# Patient Record
Sex: FEMALE | Race: WHITE | NOT HISPANIC OR LATINO | Employment: FULL TIME | URBAN - METROPOLITAN AREA
[De-identification: names, ages, dates, MRNs, and addresses within clinical notes are randomized per-mention and may not be internally consistent; named-entity substitution may affect disease eponyms.]

---

## 2023-11-08 NOTE — PROGRESS NOTES
Assessment/Plan:    Hot flashes  36 yo , last seen 2021 who reports menopause symptoms and irregular menses. LMP 2023;  2023; cycles up to 60 days prior to that. C/o night sweats, dry skin, brain fog, dyspareunia, and hot flashes during the day exacerbated merline by alcohol. Declines exam today. Last pap , due  Last mammo , orders given  No colon cancer screening to date. Discussed with patient hot flashes and menopausal symptoms of sleep deprivation and vaginal dryness. Behavioral modification, avoidance of exacerbating agents (alcohol, red wine, hot liquids), natural herbs (black cohash) and hormone replacement therapy reviewed. Aware of risks of estrogen including increased risk of clots in legs and lungs and cardiovascular events if started late in menopause. Questions answered, desires HRT. Rx sent for vivelle dot and progesterone 200 mg day 1-12 of each month. Estradiol cream rx printed. RTO 3 months for well check, pap at that time. Aware and agrees to mammogram prior to initiation of HRT with slight increased risk of breast cancer with therapy. Diagnoses and all orders for this visit:    Hot flashes  -     estradiol (Vivelle-Dot) 0.1 MG/24HR; Place 1 patch on the skin 2 (two) times a week  -     Progesterone (Prometrium) 200 MG CAPS; One tablet daily on the 1st through the 12th of each month. Encounter for screening mammogram for malignant neoplasm of breast  -     Mammo screening bilateral w 3d & cad; Future    Vaginal atrophy  -     estradiol (ESTRACE VAGINAL) 0.1 mg/g vaginal cream; Insert 1 g into the vagina 2 (two) times a week    Other orders  -     Liquid-based pap, screening  -     amLODIPine (NORVASC) 5 mg tablet; Take 5 mg by mouth daily          Subjective:      Patient ID: Kam Simons is a 37 y.o. female.     HPI here to discuss HRT    The following portions of the patient's history were reviewed and updated as appropriate: She  has a past medical history of Hypertension. She   Patient Active Problem List    Diagnosis Date Noted    Hot flashes 11/10/2023     She  has a past surgical history that includes Breast biopsy (Bilateral, );  section; Dilation and curettage of uterus (); and [place] tooth extraction. Her family history includes Atopy in her father; Celiac disease in her brother; Hypertension in her maternal grandmother and mother; No Known Problems in her brother, brother, daughter, son, and son; Parkinsonism in her maternal grandfather; Thyroid disease in her maternal grandmother. She  reports that she has never smoked. She has never used smokeless tobacco. She reports current alcohol use. She reports that she does not use drugs. Current Outpatient Medications   Medication Sig Dispense Refill    amLODIPine (NORVASC) 5 mg tablet Take 5 mg by mouth daily      [START ON 2023] estradiol (ESTRACE VAGINAL) 0.1 mg/g vaginal cream Insert 1 g into the vagina 2 (two) times a week 42.5 g 2    [START ON 2023] estradiol (Vivelle-Dot) 0.1 MG/24HR Place 1 patch on the skin 2 (two) times a week 12 patch 0    Progesterone (Prometrium) 200 MG CAPS One tablet daily on the 1st through the 12th of each month. 36 capsule 0     No current facility-administered medications for this visit. She is allergic to azithromycin. .    Review of Systems  see above    Objective:      /90 (BP Location: Left arm, Patient Position: Sitting, Cuff Size: Large)   LMP 2023 (Exact Date)          Physical Exam    Appears well, no apparent distress. Does not appear anxious or depressed.     I have spent a total time of 35 minutes on 11/10/23 in caring for this patient including Risks and benefits of tx options, Instructions for management, Patient and family education, Importance of tx compliance, Risk factor reductions, Impressions, Counseling / Coordination of care, Documenting in the medical record, Reviewing / ordering tests, medicine, procedures  , Obtaining or reviewing history  , and Communicating with other healthcare professionals .

## 2023-11-10 ENCOUNTER — OFFICE VISIT (OUTPATIENT)
Dept: OBGYN CLINIC | Facility: CLINIC | Age: 43
End: 2023-11-10

## 2023-11-10 VITALS — SYSTOLIC BLOOD PRESSURE: 152 MMHG | DIASTOLIC BLOOD PRESSURE: 90 MMHG

## 2023-11-10 DIAGNOSIS — R23.2 HOT FLASHES: Primary | ICD-10-CM

## 2023-11-10 DIAGNOSIS — Z12.31 ENCOUNTER FOR SCREENING MAMMOGRAM FOR MALIGNANT NEOPLASM OF BREAST: ICD-10-CM

## 2023-11-10 DIAGNOSIS — N95.2 VAGINAL ATROPHY: ICD-10-CM

## 2023-11-10 RX ORDER — AMLODIPINE BESYLATE 5 MG/1
5 TABLET ORAL DAILY
COMMUNITY
Start: 2023-10-17

## 2023-11-10 RX ORDER — ESTRADIOL 0.1 MG/D
1 FILM, EXTENDED RELEASE TRANSDERMAL 2 TIMES WEEKLY
Qty: 12 PATCH | Refills: 0 | Status: SHIPPED | OUTPATIENT
Start: 2023-11-13

## 2023-11-10 RX ORDER — ESTRADIOL 0.1 MG/G
1 CREAM VAGINAL 2 TIMES WEEKLY
Qty: 42.5 G | Refills: 2 | Status: SHIPPED | OUTPATIENT
Start: 2023-11-13

## 2023-11-10 RX ORDER — PROGESTERONE 200 MG/1
CAPSULE ORAL
Qty: 36 CAPSULE | Refills: 0 | Status: SHIPPED | OUTPATIENT
Start: 2023-11-10

## 2023-11-10 NOTE — ASSESSMENT & PLAN NOTE
38 yo , last seen 2021 who reports menopause symptoms and irregular menses. LMP 2023;  2023; cycles up to 60 days prior to that. C/o night sweats, dry skin, brain fog, dyspareunia, and hot flashes during the day exacerbated merline by alcohol. Declines exam today. Last pap , due  Last mammo , orders given  No colon cancer screening to date. Discussed with patient hot flashes and menopausal symptoms of sleep deprivation and vaginal dryness. Behavioral modification, avoidance of exacerbating agents (alcohol, red wine, hot liquids), natural herbs (black cohash) and hormone replacement therapy reviewed. Aware of risks of estrogen including increased risk of clots in legs and lungs and cardiovascular events if started late in menopause. Questions answered, desires HRT. Rx sent for vivelle dot and progesterone 200 mg day 1-12 of each month. Estradiol cream rx printed. RTO 3 months for well check, pap at that time. Aware and agrees to mammogram prior to initiation of HRT with slight increased risk of breast cancer with therapy.

## 2023-11-10 NOTE — LETTER
November 10, 2023     Kirsten Wilks, [address]  [address] [place], [place] [id number]    Patient: Lisha Mims   YOB: 1980   Date of Visit: 11/10/2023       Dear Dr. Beti Lee:    Lisha Mims was in to see me today for evaluation. Below are my notes for this consultation. If you have questions, please do not hesitate to call me. I look forward to following your patient along with you. Sincerely,        Andreina Meza MD        CC: No Recipients    Andreina Meza MD  11/10/2023  2:04 PM  Sign when Signing Visit  Assessment/Plan:    Hot flashes  38 yo , last seen 2021 who reports menopause symptoms and irregular menses. LMP 2023;  2023; cycles up to 60 days prior to that. C/o night sweats, dry skin, brain fog, dyspareunia, and hot flashes during the day exacerbated merline by alcohol. Declines exam today. Last pap , due  Last mammo , orders given  No colon cancer screening to date. Discussed with patient hot flashes and menopausal symptoms of sleep deprivation and vaginal dryness. Behavioral modification, avoidance of exacerbating agents (alcohol, red wine, hot liquids), natural herbs (black cohash) and hormone replacement therapy reviewed. Aware of risks of estrogen including increased risk of clots in legs and lungs and cardiovascular events if started late in menopause. Questions answered, desires HRT. Rx sent for vivelle dot and progesterone 200 mg day 1-12 of each month. Estradiol cream rx printed. RTO 3 months for well check, pap at that time. Aware and agrees to mammogram prior to initiation of HRT with slight increased risk of breast cancer with therapy. Diagnoses and all orders for this visit:    Hot flashes  -     estradiol (Vivelle-Dot) 0.1 MG/24HR; Place 1 patch on the skin 2 (two) times a week  -     Progesterone (Prometrium) 200 MG CAPS;  One tablet daily on the 1st through the 12th of each month.    Encounter for screening mammogram for malignant neoplasm of breast  -     Mammo screening bilateral w 3d & cad; Future    Vaginal atrophy  -     estradiol (ESTRACE VAGINAL) 0.1 mg/g vaginal cream; Insert 1 g into the vagina 2 (two) times a week    Other orders  -     Liquid-based pap, screening  -     amLODIPine (NORVASC) 5 mg tablet; Take 5 mg by mouth daily          Subjective:      Patient ID: Kam Simons is a 37 y.o. female. HPI here to discuss HRT    The following portions of the patient's history were reviewed and updated as appropriate: She  has a past medical history of Hypertension. She   Patient Active Problem List    Diagnosis Date Noted   • Hot flashes 11/10/2023     She  has a past surgical history that includes Breast biopsy (Bilateral, );  section; Dilation and curettage of uterus (); and [place] tooth extraction. Her family history includes Atopy in her father; Celiac disease in her brother; Hypertension in her maternal grandmother and mother; No Known Problems in her brother, brother, daughter, son, and son; Parkinsonism in her maternal grandfather; Thyroid disease in her maternal grandmother. She  reports that she has never smoked. She has never used smokeless tobacco. She reports current alcohol use. She reports that she does not use drugs. Current Outpatient Medications   Medication Sig Dispense Refill   • amLODIPine (NORVASC) 5 mg tablet Take 5 mg by mouth daily     • [START ON 2023] estradiol (ESTRACE VAGINAL) 0.1 mg/g vaginal cream Insert 1 g into the vagina 2 (two) times a week 42.5 g 2   • [START ON 2023] estradiol (Vivelle-Dot) 0.1 MG/24HR Place 1 patch on the skin 2 (two) times a week 12 patch 0   • Progesterone (Prometrium) 200 MG CAPS One tablet daily on the 1st through the 12th of each month. 36 capsule 0     No current facility-administered medications for this visit. She is allergic to azithromycin. .    Review of Systems  see above    Objective:      /90 (BP Location: Left arm, Patient Position: Sitting, Cuff Size: Large)   LMP 04/04/2023 (Exact Date)          Physical Exam    Appears well, no apparent distress. Does not appear anxious or depressed. I have spent a total time of 35 minutes on 11/10/23 in caring for this patient including Risks and benefits of tx options, Instructions for management, Patient and family education, Importance of tx compliance, Risk factor reductions, Impressions, Counseling / Coordination of care, Documenting in the medical record, Reviewing / ordering tests, medicine, procedures  , Obtaining or reviewing history  , and Communicating with other healthcare professionals .

## 2023-12-31 DIAGNOSIS — R23.2 HOT FLASHES: ICD-10-CM

## 2024-01-02 RX ORDER — ESTRADIOL 0.1 MG/D
FILM, EXTENDED RELEASE TRANSDERMAL
Qty: 8 PATCH | Refills: 1 | Status: SHIPPED | OUTPATIENT
Start: 2024-01-02

## 2024-01-05 DIAGNOSIS — Z12.31 ENCOUNTER FOR SCREENING MAMMOGRAM FOR MALIGNANT NEOPLASM OF BREAST: ICD-10-CM

## 2024-02-10 PROBLEM — Z01.419 ENCOUNTER FOR GYNECOLOGICAL EXAMINATION (GENERAL) (ROUTINE) WITHOUT ABNORMAL FINDINGS: Status: ACTIVE | Noted: 2024-02-10

## 2024-02-14 NOTE — PROGRESS NOTES
Assessment/Plan:    Encounter for gynecological examination (general) (routine) without abnormal findings  SO happy with HRT. Resolved hfs and sleeping through the night. Cycles adjusting. Refills sent  No breast or gyn complaints.   Normal breast and pelvic exams.  Last pap 10/6/2016 neg/HPV neg; repeated today  Mammo order given, last 23  Colonoscopy recs reviewed, interested in Cologuard. Order placed, aware to check for coverage with insurance prior to submitting.       Diagnoses and all orders for this visit:    Encounter for gynecological examination (general) (routine) without abnormal findings    Encounter for screening mammogram for malignant neoplasm of breast  -     Mammo screening bilateral w 3d & cad; Future    Colon cancer screening  -     Cologuard    Hot flashes  -     estradiol (VIVELLE-DOT) 0.1 MG/24HR; Place 1 patch on the skin 2 (two) times a week  -     Progesterone (Prometrium) 200 MG CAPS; One tablet daily on the 1st through the 12th of each month.    Screening for malignant neoplasm of the cervix  -     IGP, Aptima HPV, Rfx 16/18,45          Subjective:      Patient ID: Sravani Major is a 44 y.o. female.    HPI Here for well check.    The following portions of the patient's history were reviewed and updated as appropriate: She  has a past medical history of Hypertension.  She   Patient Active Problem List    Diagnosis Date Noted    Encounter for gynecological examination (general) (routine) without abnormal findings 02/10/2024    Hot flashes 11/10/2023     She  has a past surgical history that includes Breast biopsy (Bilateral, );  section; Dilation and curettage of uterus (); and [place] tooth extraction.  Her family history includes Atopy in her father; Celiac disease in her brother; Hypertension in her maternal grandmother and mother; No Known Problems in her brother, brother, daughter, son, and son; Parkinsonism in her maternal grandfather; Thyroid disease in her  maternal grandmother.  She  reports that she has never smoked. She has never used smokeless tobacco. She reports current alcohol use. She reports that she does not use drugs.  Current Outpatient Medications   Medication Sig Dispense Refill    amLODIPine (NORVASC) 5 mg tablet Take 10 mg by mouth daily      estradiol (ESTRACE VAGINAL) 0.1 mg/g vaginal cream Insert 1 g into the vagina 2 (two) times a week 42.5 g 2    [START ON 2/22/2024] estradiol (VIVELLE-DOT) 0.1 MG/24HR Place 1 patch on the skin 2 (two) times a week 24 patch 4    Progesterone (Prometrium) 200 MG CAPS One tablet daily on the 1st through the 12th of each month. 36 capsule 4     No current facility-administered medications for this visit.     She is allergic to azithromycin..    Review of Systems  No breast, bladder, bowel changes. No new persistent pain, bloating, early satiety or pelvic pressure  Appropriate withdraw bleeding    Objective:      /80   LMP 02/06/2024 (Approximate)          Physical Exam    General appearance: no distress, pleasant  Neck: thyroid without nodules or thyromegaly, no palpable adenopathy  Lymph nodes: no palpable adenopathy  Breasts: no masses, nodes or skin changes  Abdomen: soft, non tender, no palpable masses  Pelvic exam: normal external genitalia, urethral meatus normal, vagina without lesions, cervix without lesions, uterus small, non tender, no adnexal masses, non tender  Rectal exam: deferred per request. Small right perianal hyperpigmentation, not raised

## 2024-02-20 ENCOUNTER — ANNUAL EXAM (OUTPATIENT)
Dept: OBGYN CLINIC | Facility: CLINIC | Age: 44
End: 2024-02-20
Payer: COMMERCIAL

## 2024-02-20 VITALS — SYSTOLIC BLOOD PRESSURE: 126 MMHG | DIASTOLIC BLOOD PRESSURE: 80 MMHG

## 2024-02-20 DIAGNOSIS — Z12.11 COLON CANCER SCREENING: ICD-10-CM

## 2024-02-20 DIAGNOSIS — Z12.31 ENCOUNTER FOR SCREENING MAMMOGRAM FOR MALIGNANT NEOPLASM OF BREAST: ICD-10-CM

## 2024-02-20 DIAGNOSIS — Z12.4 SCREENING FOR MALIGNANT NEOPLASM OF THE CERVIX: ICD-10-CM

## 2024-02-20 DIAGNOSIS — Z01.419 ENCOUNTER FOR GYNECOLOGICAL EXAMINATION (GENERAL) (ROUTINE) WITHOUT ABNORMAL FINDINGS: Primary | ICD-10-CM

## 2024-02-20 DIAGNOSIS — R23.2 HOT FLASHES: ICD-10-CM

## 2024-02-20 PROCEDURE — S0612 ANNUAL GYNECOLOGICAL EXAMINA: HCPCS | Performed by: OBSTETRICS & GYNECOLOGY

## 2024-02-20 RX ORDER — PROGESTERONE 200 MG/1
CAPSULE ORAL
Qty: 36 CAPSULE | Refills: 4 | Status: SHIPPED | OUTPATIENT
Start: 2024-02-20

## 2024-02-20 RX ORDER — ESTRADIOL 0.1 MG/D
1 FILM, EXTENDED RELEASE TRANSDERMAL 2 TIMES WEEKLY
Qty: 24 PATCH | Refills: 4 | Status: SHIPPED | OUTPATIENT
Start: 2024-02-22

## 2024-02-20 NOTE — ASSESSMENT & PLAN NOTE
SO happy with HRT. Resolved hfs and sleeping through the night. Cycles adjusting. Refills sent  No breast or gyn complaints.   Normal breast and pelvic exams.  Last pap 10/6/2016 neg/HPV neg; repeated today  Mammo order given, last 11/30/23  Colonoscopy recs reviewed, interested in Cologuard. Order placed, aware to check for coverage with insurance prior to submitting.

## 2024-02-20 NOTE — PATIENT INSTRUCTIONS
Call for bleeding that occurs earlier than 21 days, lasts longer than 10 days or for bleeding between cycles.

## 2024-02-20 NOTE — LETTER
February 20, 2024     Lisseth Stevens, RADHA  [address] [facility] [address]    Patient: Sravani Major   YOB: 1980   Date of Visit: 2/20/2024       Dear Lisseth:    Sravani Major was in today for her annual gyn exam and follow up to hormone replacement initiation. Below are my notes for this consultation.    If you have questions, please do not hesitate to call me. I look forward to following your patient along with you.         Sincerely,        Lia Payne MD        CC: No Recipients    Lia Payne MD  2/20/2024 12:05 PM  Sign when Signing Visit  Assessment/Plan:    Encounter for gynecological examination (general) (routine) without abnormal findings  SO happy with HRT. Resolved hfs and sleeping through the night. Cycles adjusting. Refills sent  No breast or gyn complaints.   Normal breast and pelvic exams.  Last pap 10/6/2016 neg/HPV neg; repeated today  Mammo order given, last 11/30/23  Colonoscopy recs reviewed, interested in Cologuard. Order placed, aware to check for coverage with insurance prior to submitting.       Diagnoses and all orders for this visit:    Encounter for gynecological examination (general) (routine) without abnormal findings    Encounter for screening mammogram for malignant neoplasm of breast  -     Mammo screening bilateral w 3d & cad; Future    Colon cancer screening  -     Cologuard    Hot flashes  -     estradiol (VIVELLE-DOT) 0.1 MG/24HR; Place 1 patch on the skin 2 (two) times a week  -     Progesterone (Prometrium) 200 MG CAPS; One tablet daily on the 1st through the 12th of each month.    Screening for malignant neoplasm of the cervix  -     IGP, Aptima HPV, Rfx 16/18,45          Subjective:      Patient ID: Sravani Major is a 44 y.o. female.    HPI Here for well check.    The following portions of the patient's history were reviewed and updated as appropriate: She  has a past medical history of Hypertension.  She   Patient Active Problem List    Diagnosis  Date Noted   • Encounter for gynecological examination (general) (routine) without abnormal findings 02/10/2024   • Hot flashes 11/10/2023     She  has a past surgical history that includes Breast biopsy (Bilateral, );  section; Dilation and curettage of uterus (); and [place] tooth extraction.  Her family history includes Atopy in her father; Celiac disease in her brother; Hypertension in her maternal grandmother and mother; No Known Problems in her brother, brother, daughter, son, and son; Parkinsonism in her maternal grandfather; Thyroid disease in her maternal grandmother.  She  reports that she has never smoked. She has never used smokeless tobacco. She reports current alcohol use. She reports that she does not use drugs.  Current Outpatient Medications   Medication Sig Dispense Refill   • amLODIPine (NORVASC) 5 mg tablet Take 10 mg by mouth daily     • estradiol (ESTRACE VAGINAL) 0.1 mg/g vaginal cream Insert 1 g into the vagina 2 (two) times a week 42.5 g 2   • [START ON 2024] estradiol (VIVELLE-DOT) 0.1 MG/24HR Place 1 patch on the skin 2 (two) times a week 24 patch 4   • Progesterone (Prometrium) 200 MG CAPS One tablet daily on the 1st through the 12th of each month. 36 capsule 4     No current facility-administered medications for this visit.     She is allergic to azithromycin..    Review of Systems  No breast, bladder, bowel changes. No new persistent pain, bloating, early satiety or pelvic pressure  Appropriate withdraw bleeding    Objective:      /80   LMP 2024 (Approximate)          Physical Exam    General appearance: no distress, pleasant  Neck: thyroid without nodules or thyromegaly, no palpable adenopathy  Lymph nodes: no palpable adenopathy  Breasts: no masses, nodes or skin changes  Abdomen: soft, non tender, no palpable masses  Pelvic exam: normal external genitalia, urethral meatus normal, vagina without lesions, cervix without lesions, uterus small, non  tender, no adnexal masses, non tender  Rectal exam: deferred per request. Small right perianal hyperpigmentation, not raised

## 2024-02-21 PROBLEM — Z01.419 ENCOUNTER FOR GYNECOLOGICAL EXAMINATION (GENERAL) (ROUTINE) WITHOUT ABNORMAL FINDINGS: Status: RESOLVED | Noted: 2024-02-10 | Resolved: 2024-02-21

## 2024-02-26 LAB
CYTOLOGIST CVX/VAG CYTO: NORMAL
DX ICD CODE: NORMAL
HPV GENOTYPE REFLEX: NORMAL
HPV I/H RISK 4 DNA CVX QL PROBE+SIG AMP: NEGATIVE
OTHER STN SPEC: NORMAL
PATH REPORT.FINAL DX SPEC: NORMAL
SL AMB NOTE:: NORMAL
SL AMB SPECIMEN ADEQUACY: NORMAL
SL AMB TEST METHODOLOGY: NORMAL

## 2025-02-11 ENCOUNTER — TELEPHONE (OUTPATIENT)
Age: 45
End: 2025-02-11

## 2025-02-11 NOTE — TELEPHONE ENCOUNTER
Patient will be on her menses next week and wanted to make sure it is okay to keep her visit as scheduled. Informed patient, yes it is fine to still come to her appt. No further questions.

## 2025-02-14 NOTE — ASSESSMENT & PLAN NOTE
Here for well check, doing great with vivelle dot and cyclic progesterone. Appropriate withdraw bleed for 8 days at end of progesterone course.   No breast or gyn concerns.     Normal breast and pelvic exams.  Last pap 2/20/24 neg/HPV neg; due 2029  Mammo order given, last 2/17/25 [facility] per pt  Colon cancer screening discussed with initiation of testing at 43 now recommended. Provider information given.

## 2025-02-14 NOTE — PROGRESS NOTES
Name: Sravani Major      : 1980      MRN: [id number]  Encounter Provider: Lia Payne MD  Encounter Date: 2025   Encounter department: [facility]  :  Assessment & Plan  Encounter for gynecological examination (general) (routine) without abnormal findings  Here for well check, doing great with vivelle dot and cyclic progesterone. Appropriate withdraw bleed for 8 days at end of progesterone course.   No breast or gyn concerns.     Normal breast and pelvic exams.  Last pap 24 neg/HPV neg; due   Mammo order given, last 25 [facility] per pt  Colon cancer screening discussed with initiation of testing at 43 now recommended. Provider information given.         Hormone replacement therapy (HRT)  Very happy with HRT, no symptoms. Appropriate withdraw bleed.   Warnings given.   Refills sent.  Orders:    estradiol (VIVELLE-DOT) 0.1 MG/24HR; Place 1 patch on the skin 2 (two) times a week    Progesterone (Prometrium) 200 MG CAPS; One tablet daily on the 1st through the 12th of each month.    Breast cancer screening by mammogram    Orders:    Mammo screening bilateral w 3d and cad; Future        History of Present Illness   HPI  Sravani Major is a 45 y.o. female who presents for well check.    Review of Systems  No menorrhagia, dysmenorrhea, intermenstrual bleeding  No breast, bladder, bowel changes. No new persistent pain, bloating, early satiety or pelvic pressure      Past Medical History   Past Medical History:   Diagnosis Date    Hypertension      Past Surgical History:   Procedure Laterality Date    BREAST BIOPSY Bilateral 2019     SECTION      , , 2014    DILATION AND CURETTAGE OF UTERUS  2013    WISDOM TOOTH EXTRACTION       Family History   Problem Relation Age of Onset    Hypertension Mother     Atopy Father     Celiac disease Brother     No Known Problems Brother     No Known Problems Brother     Hypertension Maternal Grandmother     Thyroid disease  Maternal Grandmother     Parkinsonism Maternal Grandfather     No Known Problems Daughter     No Known Problems Son     No Known Problems Son     Breast cancer Neg Hx     Uterine cancer Neg Hx     Ovarian cancer Neg Hx     Colon cancer Neg Hx     Heart attack Neg Hx     Stroke Neg Hx     Thrombosis Neg Hx     Colon polyps Neg Hx       reports that she has never smoked. She has never used smokeless tobacco. She reports current alcohol use. She reports that she does not use drugs.  Current Outpatient Medications on File Prior to Visit   Medication Sig Dispense Refill    amLODIPine (NORVASC) 10 mg tablet Take 1 tablet by mouth in the morning      [DISCONTINUED] estradiol (ESTRACE VAGINAL) 0.1 mg/g vaginal cream Insert 1 g into the vagina 2 (two) times a week 42.5 g 2    [DISCONTINUED] estradiol (VIVELLE-DOT) 0.1 MG/24HR Place 1 patch on the skin 2 (two) times a week 24 patch 4    [DISCONTINUED] Progesterone (Prometrium) 200 MG CAPS One tablet daily on the 1st through the 12th of each month. 36 capsule 4    [DISCONTINUED] amLODIPine (NORVASC) 5 mg tablet Take 10 mg by mouth daily (Patient not taking: Reported on 2/18/2025)       No current facility-administered medications on file prior to visit.     Allergies   Allergen Reactions    Azithromycin Hives         Objective   /64 (BP Location: Left arm, Patient Position: Sitting, Cuff Size: Large)   LMP 02/11/2025 (Exact Date)      Physical Exam  General appearance: no distress, pleasant  Neck: thyroid without nodules or thyromegaly, no palpable adenopathy  Lymph nodes: no palpable adenopathy  Breasts: no masses, nodes or skin changes  Abdomen: soft, non tender, no palpable masses  Pelvic exam: normal external genitalia, urethral meatus normal, vagina without lesions, cervix without lesions +menses, uterus small, non tender, no adnexal masses, non tender  Rectal exam: deferred per request

## 2025-02-18 ENCOUNTER — ANNUAL EXAM (OUTPATIENT)
Dept: OBGYN CLINIC | Facility: CLINIC | Age: 45
End: 2025-02-18
Payer: COMMERCIAL

## 2025-02-18 VITALS — SYSTOLIC BLOOD PRESSURE: 128 MMHG | DIASTOLIC BLOOD PRESSURE: 64 MMHG

## 2025-02-18 DIAGNOSIS — Z12.31 BREAST CANCER SCREENING BY MAMMOGRAM: ICD-10-CM

## 2025-02-18 DIAGNOSIS — Z79.890 HORMONE REPLACEMENT THERAPY (HRT): ICD-10-CM

## 2025-02-18 DIAGNOSIS — Z01.419 ENCOUNTER FOR GYNECOLOGICAL EXAMINATION (GENERAL) (ROUTINE) WITHOUT ABNORMAL FINDINGS: Primary | ICD-10-CM

## 2025-02-18 PROCEDURE — S0612 ANNUAL GYNECOLOGICAL EXAMINA: HCPCS | Performed by: OBSTETRICS & GYNECOLOGY

## 2025-02-18 RX ORDER — ESTRADIOL 0.1 MG/D
1 FILM, EXTENDED RELEASE TRANSDERMAL 2 TIMES WEEKLY
Qty: 24 PATCH | Refills: 4 | Status: SHIPPED | OUTPATIENT
Start: 2025-02-20

## 2025-02-18 RX ORDER — PROGESTERONE 200 MG/1
CAPSULE ORAL
Qty: 36 CAPSULE | Refills: 4 | Status: SHIPPED | OUTPATIENT
Start: 2025-02-18

## 2025-02-18 RX ORDER — AMLODIPINE BESYLATE 10 MG/1
1 TABLET ORAL DAILY
COMMUNITY
Start: 2024-11-23

## 2025-02-18 NOTE — LETTER
2025     Danna Emmanuel MD  [facility]  [address] [address]    Patient: Sravani Major   YOB: 1980   Date of Visit: 2025       Dear Dr. Emmanuel:    Sravani Major was in today for her annual gyn exam. Below are my notes for this visit.    If you have questions, please do not hesitate to call me. I look forward to following your patient along with you.         Sincerely,        Lia Payne MD        CC: No Recipients    Lia Payne MD  2025  2:04 PM  Sign when Signing Visit  Name: Sravani Major      : 1980      MRN: [id number]  Encounter Provider: Lia Payne MD  Encounter Date: 2025   Encounter department: [facility] [place]  :  Assessment & Plan  Encounter for gynecological examination (general) (routine) without abnormal findings  Here for well check, doing great with vivelle dot and cyclic progesterone. Appropriate withdraw bleed for 8 days at end of progesterone course.   No breast or gyn concerns.     Normal breast and pelvic exams.  Last pap 24 neg/HPV neg; due   Mammo order given, last 25 GVH per pt  Colon cancer screening discussed with initiation of testing at 43 now recommended. Provider information given.         Hormone replacement therapy (HRT)  Very happy with HRT, no symptoms. Appropriate withdraw bleed.   Warnings given.   Refills sent.  Orders:  •  estradiol (VIVELLE-DOT) 0.1 MG/24HR; Place 1 patch on the skin 2 (two) times a week  •  Progesterone (Prometrium) 200 MG CAPS; One tablet daily on the 1st through the 12th of each month.    Breast cancer screening by mammogram    Orders:  •  Mammo screening bilateral w 3d and cad; Future        History of Present Illness  HPI  Sravani Major is a 45 y.o. female who presents for well check.    Review of Systems  No menorrhagia, dysmenorrhea, intermenstrual bleeding  No breast, bladder, bowel changes. No new  persistent pain, bloating, early satiety or pelvic pressure      Past Medical History  Past Medical History:   Diagnosis Date   • Hypertension      Past Surgical History:   Procedure Laterality Date   • BREAST BIOPSY Bilateral    •  SECTION      , ,    • DILATION AND CURETTAGE OF UTERUS     • WISDOM TOOTH EXTRACTION       Family History   Problem Relation Age of Onset   • Hypertension Mother    • Atopy Father    • Celiac disease Brother    • No Known Problems Brother    • No Known Problems Brother    • Hypertension Maternal Grandmother    • Thyroid disease Maternal Grandmother    • Parkinsonism Maternal Grandfather    • No Known Problems Daughter    • No Known Problems Son    • No Known Problems Son    • Breast cancer Neg Hx    • Uterine cancer Neg Hx    • Ovarian cancer Neg Hx    • Colon cancer Neg Hx    • Heart attack Neg Hx    • Stroke Neg Hx    • Thrombosis Neg Hx    • Colon polyps Neg Hx       reports that she has never smoked. She has never used smokeless tobacco. She reports current alcohol use. She reports that she does not use drugs.  Current Outpatient Medications on File Prior to Visit   Medication Sig Dispense Refill   • amLODIPine (NORVASC) 10 mg tablet Take 1 tablet by mouth in the morning     • [DISCONTINUED] estradiol (ESTRACE VAGINAL) 0.1 mg/g vaginal cream Insert 1 g into the vagina 2 (two) times a week 42.5 g 2   • [DISCONTINUED] estradiol (VIVELLE-DOT) 0.1 MG/24HR Place 1 patch on the skin 2 (two) times a week 24 patch 4   • [DISCONTINUED] Progesterone (Prometrium) 200 MG CAPS One tablet daily on the 1st through the 12th of each month. 36 capsule 4   • [DISCONTINUED] amLODIPine (NORVASC) 5 mg tablet Take 10 mg by mouth daily (Patient not taking: Reported on 2025)       No current facility-administered medications on file prior to visit.     Allergies   Allergen Reactions   • Azithromycin Hives         Objective  /64 (BP Location: Left arm, Patient Position:  Sitting, Cuff Size: Large)   LMP 02/11/2025 (Exact Date)      Physical Exam  General appearance: no distress, pleasant  Neck: thyroid without nodules or thyromegaly, no palpable adenopathy  Lymph nodes: no palpable adenopathy  Breasts: no masses, nodes or skin changes  Abdomen: soft, non tender, no palpable masses  Pelvic exam: normal external genitalia, urethral meatus normal, vagina without lesions, cervix without lesions +menses, uterus small, non tender, no adnexal masses, non tender  Rectal exam: deferred per request

## 2025-02-18 NOTE — ASSESSMENT & PLAN NOTE
Very happy with HRT, no symptoms. Appropriate withdraw bleed.   Warnings given.   Refills sent.  Orders:    estradiol (VIVELLE-DOT) 0.1 MG/24HR; Place 1 patch on the skin 2 (two) times a week    Progesterone (Prometrium) 200 MG CAPS; One tablet daily on the 1st through the 12th of each month.

## 2025-02-20 ENCOUNTER — RESULTS FOLLOW-UP (OUTPATIENT)
Dept: OBGYN CLINIC | Facility: CLINIC | Age: 45
End: 2025-02-20

## 2025-02-28 ENCOUNTER — RESULTS FOLLOW-UP (OUTPATIENT)
Dept: OBGYN CLINIC | Facility: CLINIC | Age: 45
End: 2025-02-28